# Patient Record
(demographics unavailable — no encounter records)

---

## 2024-10-24 NOTE — PROCEDURE
[Complicated Symptoms] : complicated symptoms requiring more thorough examination than provided by mirror [None] : none [Flexible Endoscope] : examined with the flexible endoscope [de-identified] : Flexible laryngoscopy performed. Nasal cavity, nasopharynx, oropharynx, hypopharynx, and larynx evaluated. No masses or lesions, bilateral true vocal folds symmetric and mobile.

## 2024-10-24 NOTE — REASON FOR VISIT
[Subsequent Evaluation] : a subsequent evaluation for [FreeTextEntry2] : malignant neoplasm of the tonsil.

## 2024-10-24 NOTE — HISTORY OF PRESENT ILLNESS
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 He is doing well without complaints.   He is getting random muscle cramps in the neck (mid jugular) that are happened very randomly and less frequently than they used to. No further complaints.

## 2024-10-24 NOTE — HISTORY OF PRESENT ILLNESS
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 He is doing well without complaints.   He is getting random muscle cramps in the neck (mid jugular) that are happened very randomly and less frequently than they used to. No further complaints.

## 2024-10-24 NOTE — PROCEDURE
[Complicated Symptoms] : complicated symptoms requiring more thorough examination than provided by mirror [None] : none [Flexible Endoscope] : examined with the flexible endoscope [de-identified] : Flexible laryngoscopy performed. Nasal cavity, nasopharynx, oropharynx, hypopharynx, and larynx evaluated. No masses or lesions, bilateral true vocal folds symmetric and mobile.

## 2025-04-30 NOTE — HISTORY OF PRESENT ILLNESS
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 He is doing well without complaints.   He is still getting random muscle cramps in the neck (mid jugular) but not as frequent. No further complaints.

## 2025-04-30 NOTE — PHYSICAL EXAM
[de-identified] : fibrosis [Normal] : mucosa is normal [Midline] : trachea located in midline position [Removed] : palatine tonsils previously removed

## 2025-04-30 NOTE — PROCEDURE
[Complicated Symptoms] : complicated symptoms requiring more thorough examination than provided by mirror [None] : none [Flexible Endoscope] : examined with the flexible endoscope [de-identified] : Flexible laryngoscopy performed. Nasal cavity, nasopharynx, oropharynx, hypopharynx, and larynx evaluated. No masses or lesions, bilateral true vocal folds symmetric and mobile.